# Patient Record
(demographics unavailable — no encounter records)

---

## 2024-10-23 NOTE — PHYSICAL EXAM
[NI] : Normal [de-identified] : Shape and contour are good scars red and raised, left more than the right

## 2024-10-23 NOTE — REASON FOR VISIT
[Follow-Up: _____] : a [unfilled] follow-up visit [FreeTextEntry1] : Patient returns with improvement in arm scars following kenalog injection

## 2024-10-23 NOTE — ASSESSMENT
[FreeTextEntry1] : A: Prominent scars following Brachioplasty- excellent cosmetic shape and contour  P: Kenalog 40 mg injection, tolerated well.

## 2025-01-02 NOTE — REASON FOR VISIT
[Follow-Up: _____] : a [unfilled] follow-up visit [FreeTextEntry1] : Ms. SUZY DENISE returns for a follow up visit, generally doing well with no complaints and no fevers or chills at home. left distal arm scar problematic

## 2025-01-02 NOTE — PHYSICAL EXAM
[NI] : Normal [de-identified] : Shape and contour are good bilaterally, and right scar fading. Distal 6 cm on the left side still red raised and . Some laxity now present

## 2025-03-06 NOTE — REASON FOR VISIT
[Follow-Up: _____] : a [unfilled] follow-up visit [FreeTextEntry1] : Ms. SUZY DENISE returns for a follow up visit, generally doing well with no complaints and no fevers or chills at home, bothered by discomfort along left distal arm scar

## 2025-03-06 NOTE — ASSESSMENT
[FreeTextEntry1] : A: Scar contracture left distal arm. P: We discussed the treatment options and favor excision of the scar with flap closure.  Reviewed the material, and alternatives with Ms. SUZY DENISE, including no surgery, and she understands and wishes to proceed. Patient will consider.

## 2025-03-06 NOTE — PHYSICAL EXAM
[NI] : Normal [de-identified] : Shape and contour are good, right scar healing well. Left scar  and adherent, reddish color.   Surrounding skin laxity is present.

## 2025-04-30 NOTE — ADDENDUM
[FreeTextEntry1] :  Documented by Stephanie Goldman acting as scribe for Dr. Rebolledo 04/29/2025  All medical record entries made by the scribe were at my, Dr. Rebolledo, direction and personally dictated by me on 04/29/2025. I have reviewed the chart and agree that the record accurately reflects my personal performance of the history, physical exam, assessment and plan. I have also personally directed, reviewed, and agreed with the chart.

## 2025-04-30 NOTE — DISCUSSION/SUMMARY
[FreeTextEntry1] : Because of patient's symptoms of dyspnea on exertion I recommend we get a echocardiogram to assess for LVEF and rule out any valvular heart abnormalities   Screening for cardiovascular disease, at the moment patient does not have any anginal symptoms.  There are stressors in the last year there was negative for ischemia.  I recommended to the patient to follow-up with primary care physician for evaluation for screening cardiovascular risk factors i.e. checking for lipids A1c.  Also advised the patient to adopt healthy lifestyle habits that includes healthy eating habits by cutting down on carbs sugars processed foods and focus on high-fiber food items and exercising for 35 to 40 minutes every day. [EKG obtained to assist in diagnosis and management of assessed problem(s)] : EKG obtained to assist in diagnosis and management of assessed problem(s)

## 2025-04-30 NOTE — ASSESSMENT
Detail Level: Detailed
[FreeTextEntry1] : Screening for cardiovascular disease, at the moment patient does not have any anginal symptoms.  There are stressors in the last year there was negative for ischemia.  I recommended to the patient to follow-up with primary care physician for evaluation for screening cardiovascular risk factors i.e. checking for lipids A1c.  Also advised the patient to adopt healthy lifestyle habits that includes healthy eating habits by cutting down on carbs sugars processed foods and focus on high-fiber food items and exercising for 35 to 40 minutes every day.  I will be ordering an echocardiogram due to patients' shortness of breath

## 2025-06-03 NOTE — PROCEDURE
[FreeTextEntry1] : Scar contracture, left upper arm  [FreeTextEntry2] : Scar revision with excision and flap advancement closure. [FreeTextEntry3] : Xylocaine with epinephrine  [FreeTextEntry4] : minimal  [FreeTextEntry5] : none  [FreeTextEntry6] : The left distal arm scar was marked for excision and flap advancement marked out, with the patient in agreement.  Following a sterile prep and drape, Xylocaine was injected eze local anesthesia and the scar was excised as marked.  Hemostasis was assured, and flaps undermined, advanced, and inset with Monocryl and nylon sutures.   Patient tolerated well.   [FreeTextEntry7] : left arm scar

## 2025-06-03 NOTE — ASSESSMENT
[FreeTextEntry1] : A: Scar contracture, left upper arm  P: Scar revision with excision and flap advancement closure. Tolerated well, instructions reviewed.

## 2025-06-03 NOTE — REASON FOR VISIT
[Procedure: _________] : a [unfilled] procedure visit [FreeTextEntry1] : Patient returns for revision of left distal upper arm scar contracture

## 2025-06-11 NOTE — REASON FOR VISIT
[Post Op: _________] : a [unfilled] post op visit [FreeTextEntry1] : Ms. SUZY DENISE returns for a follow up visit, generally doing well with no complaints and no fevers or chills at home., generally doing well

## 2025-06-11 NOTE — ASSESSMENT
[FreeTextEntry1] : A: Doing well following scar revision left arm P: care reviewed. Follow next week for suture removal.

## 2025-06-18 NOTE — REASON FOR VISIT
[Post Op: _________] : a [unfilled] post op visit [FreeTextEntry1] : Ms. SUZY DENISE returns for suture removal, generally doing well with no complaints and no fevers or chills at home.

## 2025-06-18 NOTE — ASSESSMENT
[FreeTextEntry1] : A: Doing well following scar revision left arm P: Sutures removed and care reviewed.

## 2025-06-18 NOTE — PHYSICAL EXAM
[NI] : Normal [de-identified] : Left distal arm shape and contour are good, incisions clean and intact. No surrounding erythema or purulence.   Flaps viable no collection.

## 2025-07-02 NOTE — REASON FOR VISIT
[Post Op: _________] : a [unfilled] post op visit [FreeTextEntry1] : Ms. SUZY DENISE returns for a follow up visit, generally doing well with no complaints and no fevers or chills at home., generally feeling better and returned to work